# Patient Record
Sex: FEMALE | Race: WHITE
[De-identification: names, ages, dates, MRNs, and addresses within clinical notes are randomized per-mention and may not be internally consistent; named-entity substitution may affect disease eponyms.]

---

## 2017-01-10 ENCOUNTER — RX ONLY (OUTPATIENT)
Age: 67
Setting detail: RX ONLY
End: 2017-01-10

## 2017-01-10 RX ORDER — OXYCODONE HYDROCHLORIDE AND ACETAMINOPHEN 5; 325 MG/1; MG/1
TABLET ORAL
Qty: 40 | Refills: 0 | COMMUNITY
Start: 2017-01-10

## 2017-01-11 ENCOUNTER — RX ONLY (OUTPATIENT)
Age: 67
Setting detail: RX ONLY
End: 2017-01-11

## 2017-01-11 ENCOUNTER — APPOINTMENT (RX ONLY)
Dept: URBAN - METROPOLITAN AREA CLINIC 22 | Facility: CLINIC | Age: 67
Setting detail: DERMATOLOGY
End: 2017-01-11

## 2017-01-11 DIAGNOSIS — Z41.9 ENCOUNTER FOR PROCEDURE FOR PURPOSES OTHER THAN REMEDYING HEALTH STATE, UNSPECIFIED: ICD-10-CM

## 2017-01-11 PROCEDURE — ? PRESCRIPTION

## 2017-01-11 PROCEDURE — 99004: CPT

## 2017-01-11 PROCEDURE — ? PRE-OP WORKLIST

## 2017-01-11 RX ORDER — MEPERIDINE HYDROCHLORIDE 100 MG/1
TABLET ORAL
Qty: 40 | Refills: 0 | COMMUNITY
Start: 2017-01-11

## 2017-01-11 RX ORDER — ONDANSETRON HYDROCHLORIDE 4 MG/1
TABLET, ORALLY DISINTEGRATING ORAL
Qty: 15 | Refills: 0 | Status: ERX | COMMUNITY
Start: 2017-01-11

## 2017-01-11 RX ORDER — CEPHALEXIN 500 MG/1
CAPSULE ORAL
Qty: 28 | Refills: 0 | Status: ERX | COMMUNITY
Start: 2017-01-11

## 2017-01-11 RX ADMIN — ONDANSETRON HYDROCHLORIDE: 4 TABLET, ORALLY DISINTEGRATING ORAL at 16:18

## 2017-01-11 RX ADMIN — CEPHALEXIN: 500 CAPSULE ORAL at 16:18

## 2017-01-11 ASSESSMENT — LOCATION DETAILED DESCRIPTION DERM
LOCATION DETAILED: PERIUMBILICAL SKIN
LOCATION DETAILED: RIGHT MEDIAL BREAST 5-6:00 REGION
LOCATION DETAILED: LEFT MEDIAL BREAST 6-7:00 REGION
LOCATION DETAILED: RIGHT INFERIOR LATERAL LOWER BACK
LOCATION DETAILED: LEFT MEDIAL BREAST 8-9:00 REGION
LOCATION DETAILED: LEFT INFERIOR LATERAL LOWER BACK

## 2017-01-11 ASSESSMENT — LOCATION SIMPLE DESCRIPTION DERM
LOCATION SIMPLE: LEFT BREAST
LOCATION SIMPLE: RIGHT BREAST
LOCATION SIMPLE: LEFT LOWER BACK
LOCATION SIMPLE: RIGHT LOWER BACK
LOCATION SIMPLE: ABDOMEN

## 2017-01-11 ASSESSMENT — LOCATION ZONE DERM: LOCATION ZONE: TRUNK

## 2017-01-11 NOTE — HPI: PREOPERATIVE APPOINTMENT (BRIEF)
Has The Patient Completed Informed Consent?: is scheduled to complete informed consent documentation during this visit
Has The Patient Fulfilled Financial Responsibilities For Surgical Scheduling?: has fulfilled financial responsibilities
Have Arrangements And Instructions Been Made For Patient Transportation?: Transportation arrangements will be made
Date Of Procedure?: 01/25/2017
Facility: [facility]
Surgeon: Dr. Quinn
When Were The Required Fees Received?: 01/10/2017

## 2017-01-11 NOTE — PROCEDURE: PRE-OP WORKLIST
Surgery Scheduled: extended abdominoplasty with liposuction, breast reduction, breast augmentation with saline implants
Admission Status: outpatient
Detail Level: Simple
Photos Taken?: yes
Date Of Surgery: 01/25/2017
Surgeon: Dr. Quinn

## 2017-01-25 ENCOUNTER — APPOINTMENT (RX ONLY)
Dept: URBAN - METROPOLITAN AREA SURGERY CENTER 4 | Facility: SURGERY CENTER | Age: 67
Setting detail: DERMATOLOGY
End: 2017-01-25

## 2017-01-25 DIAGNOSIS — Z41.1 ENCOUNTER FOR COSMETIC SURGERY: ICD-10-CM

## 2017-01-25 PROCEDURE — 19325 BREAST AUGMENTATION W/IMPLT: CPT | Mod: 50

## 2017-01-25 PROCEDURE — 15847 EXC SKIN ABD ADD-ON: CPT

## 2017-01-25 PROCEDURE — 19316 MASTOPEXY: CPT | Mod: 50

## 2017-01-25 PROCEDURE — ? SET GLOBAL PERIOD

## 2017-01-25 ASSESSMENT — LOCATION SIMPLE DESCRIPTION DERM
LOCATION SIMPLE: ABDOMEN
LOCATION SIMPLE: RIGHT BREAST
LOCATION SIMPLE: LEFT BREAST

## 2017-01-25 ASSESSMENT — LOCATION DETAILED DESCRIPTION DERM
LOCATION DETAILED: RIGHT MEDIAL BREAST 5-6:00 REGION
LOCATION DETAILED: PERIUMBILICAL SKIN
LOCATION DETAILED: LEFT MEDIAL BREAST 7-8:00 REGION

## 2017-01-25 ASSESSMENT — LOCATION ZONE DERM: LOCATION ZONE: TRUNK

## 2017-01-25 NOTE — PROCEDURE: SET GLOBAL PERIOD
Other Surgery Performed: abdominoplasty with liposuction bilateral reduction with augmentation
Date Of Surgery (Mm/Dd/Yyyy): 01/25/2017
Surgery Global Period: 0
Detail Level: Detailed

## 2017-01-26 ENCOUNTER — APPOINTMENT (RX ONLY)
Dept: URBAN - METROPOLITAN AREA CLINIC 22 | Facility: CLINIC | Age: 67
Setting detail: DERMATOLOGY
End: 2017-01-26

## 2017-01-26 DIAGNOSIS — Z48.89 ENCOUNTER FOR OTHER SPECIFIED SURGICAL AFTERCARE: ICD-10-CM

## 2017-01-26 PROCEDURE — ? COUNSELING - POST-OP CHECK, BREAST REDUCTION

## 2017-01-26 PROCEDURE — ? COUNSELING - POST-OP CHECK, ABDOMINOPLASTY

## 2017-01-26 PROCEDURE — 99024 POSTOP FOLLOW-UP VISIT: CPT

## 2017-01-26 PROCEDURE — ? COUNSELING - POST-OP CHECK, BREAST AUGMENTATION

## 2017-01-26 PROCEDURE — ? COUNSELING - POST-OP CHECK, LIPOSUCTION

## 2017-01-26 ASSESSMENT — LOCATION DETAILED DESCRIPTION DERM
LOCATION DETAILED: RIGHT INFERIOR LATERAL LOWER BACK
LOCATION DETAILED: LEFT INFERIOR LATERAL LOWER BACK
LOCATION DETAILED: LEFT MEDIAL BREAST 8-9:00 REGION
LOCATION DETAILED: PERIUMBILICAL SKIN
LOCATION DETAILED: RIGHT INFERIOR LATERAL LOWER BACK
LOCATION DETAILED: RIGHT BUTTOCK
LOCATION DETAILED: LEFT INFERIOR LATERAL LOWER BACK
LOCATION DETAILED: PERIUMBILICAL SKIN
LOCATION DETAILED: RIGHT LATERAL BREAST 6-7:00 REGION
LOCATION DETAILED: RIGHT MEDIAL BREAST 5-6:00 REGION

## 2017-01-26 ASSESSMENT — LOCATION SIMPLE DESCRIPTION DERM
LOCATION SIMPLE: RIGHT LOWER BACK
LOCATION SIMPLE: RIGHT BREAST
LOCATION SIMPLE: LEFT LOWER BACK
LOCATION SIMPLE: RIGHT BUTTOCK
LOCATION SIMPLE: ABDOMEN
LOCATION SIMPLE: LEFT LOWER BACK
LOCATION SIMPLE: RIGHT LOWER BACK
LOCATION SIMPLE: LEFT BREAST
LOCATION SIMPLE: ABDOMEN

## 2017-01-26 ASSESSMENT — LOCATION ZONE DERM
LOCATION ZONE: TRUNK
LOCATION ZONE: TRUNK

## 2017-01-26 NOTE — PROCEDURE: REASSURANCE
Follow-Up Increment: 1
Follow-Up Interval: week
Information Provided: Reassurance was provided to the patient that the clinical findings are within expected normal limits and do not require specific further evaluation or treatment at this time.
Detail Level: Simple
Follow-Up: yes

## 2017-02-02 ENCOUNTER — APPOINTMENT (RX ONLY)
Dept: URBAN - METROPOLITAN AREA CLINIC 22 | Facility: CLINIC | Age: 67
Setting detail: DERMATOLOGY
End: 2017-02-02

## 2017-02-02 ENCOUNTER — RX ONLY (OUTPATIENT)
Age: 67
Setting detail: RX ONLY
End: 2017-02-02

## 2017-02-02 DIAGNOSIS — Z48.89 ENCOUNTER FOR OTHER SPECIFIED SURGICAL AFTERCARE: ICD-10-CM

## 2017-02-02 PROCEDURE — 99024 POSTOP FOLLOW-UP VISIT: CPT

## 2017-02-02 PROCEDURE — ? COUNSELING - POST-OP CHECK, ABDOMINOPLASTY

## 2017-02-02 PROCEDURE — ? COUNSELING - POST-OP CHECK, LIPOSUCTION

## 2017-02-02 PROCEDURE — ? SUTURE REMOVAL

## 2017-02-02 PROCEDURE — ? COUNSELING - POST-OP CHECK, BREAST AUGMENTATION

## 2017-02-02 PROCEDURE — ? COUNSELING - POST-OP CHECK, BREAST REDUCTION

## 2017-02-02 PROCEDURE — ? DRAIN REMOVAL

## 2017-02-02 RX ORDER — OXYCODONE HYDROCHLORIDE AND ACETAMINOPHEN 5; 325 MG/1; MG/1
TABLET ORAL
Qty: 40 | Refills: 0 | Status: CANCELLED

## 2017-02-02 ASSESSMENT — LOCATION SIMPLE DESCRIPTION DERM
LOCATION SIMPLE: GROIN
LOCATION SIMPLE: RIGHT LOWER BACK
LOCATION SIMPLE: RIGHT LOWER BACK
LOCATION SIMPLE: ABDOMEN
LOCATION SIMPLE: LEFT BREAST
LOCATION SIMPLE: LEFT LOWER BACK
LOCATION SIMPLE: ABDOMEN
LOCATION SIMPLE: RIGHT BREAST
LOCATION SIMPLE: LEFT LOWER BACK
LOCATION SIMPLE: GLUTEAL CLEFT
LOCATION SIMPLE: RIGHT BUTTOCK

## 2017-02-02 ASSESSMENT — LOCATION ZONE DERM
LOCATION ZONE: TRUNK
LOCATION ZONE: TRUNK

## 2017-02-02 ASSESSMENT — LOCATION DETAILED DESCRIPTION DERM
LOCATION DETAILED: LEFT MEDIAL BREAST 8-9:00 REGION
LOCATION DETAILED: RIGHT INFERIOR LATERAL LOWER BACK
LOCATION DETAILED: PERIUMBILICAL SKIN
LOCATION DETAILED: LEFT INFERIOR LATERAL LOWER BACK
LOCATION DETAILED: RIGHT MEDIAL BREAST 5-6:00 REGION
LOCATION DETAILED: RIGHT SUPRAPUBIC SKIN
LOCATION DETAILED: LEFT INFERIOR LATERAL LOWER BACK
LOCATION DETAILED: PERIUMBILICAL SKIN
LOCATION DETAILED: RIGHT BUTTOCK
LOCATION DETAILED: GLUTEAL CLEFT
LOCATION DETAILED: RIGHT INFERIOR LATERAL LOWER BACK
LOCATION DETAILED: RIGHT LATERAL BREAST 6-7:00 REGION

## 2017-02-02 NOTE — PROCEDURE: REASSURANCE
Follow-Up: yes
Information Provided: Reassurance was provided to the patient that the clinical findings are within expected normal limits and do not require specific further evaluation or treatment at this time.
Follow-Up Increment: 1
Follow-Up Interval: week
Detail Level: Simple

## 2017-02-02 NOTE — HPI: POST-OP CHECK (EXTENDED)
How Severe Is Your Pain?: moderate
How Is Your Wound Healing?: healing well
What Is Your Overall Satisfaction Level With The Current Outcome?: satisfied
Date Of Procedure: 1/25/2017

## 2017-02-09 ENCOUNTER — APPOINTMENT (RX ONLY)
Dept: URBAN - METROPOLITAN AREA CLINIC 22 | Facility: CLINIC | Age: 67
Setting detail: DERMATOLOGY
End: 2017-02-09

## 2017-02-09 DIAGNOSIS — Z48.89 ENCOUNTER FOR OTHER SPECIFIED SURGICAL AFTERCARE: ICD-10-CM

## 2017-02-09 PROCEDURE — ? COUNSELING - POST-OP CHECK, ABDOMINOPLASTY

## 2017-02-09 PROCEDURE — ? COUNSELING - POST-OP CHECK, BREAST REDUCTION

## 2017-02-09 PROCEDURE — 99024 POSTOP FOLLOW-UP VISIT: CPT

## 2017-02-09 PROCEDURE — ? COUNSELING - POST-OP CHECK, LIPOSUCTION

## 2017-02-09 PROCEDURE — ? STAPLE REMOVAL

## 2017-02-09 PROCEDURE — ? COUNSELING - POST-OP CHECK, BREAST AUGMENTATION

## 2017-02-09 PROCEDURE — ? RECOMMENDATIONS

## 2017-02-09 ASSESSMENT — LOCATION SIMPLE DESCRIPTION DERM
LOCATION SIMPLE: RIGHT LOWER BACK
LOCATION SIMPLE: ABDOMEN
LOCATION SIMPLE: LEFT LOWER BACK
LOCATION SIMPLE: LEFT BREAST
LOCATION SIMPLE: RIGHT BREAST

## 2017-02-09 ASSESSMENT — LOCATION ZONE DERM: LOCATION ZONE: TRUNK

## 2017-02-09 ASSESSMENT — LOCATION DETAILED DESCRIPTION DERM
LOCATION DETAILED: RIGHT MEDIAL BREAST 5-6:00 REGION
LOCATION DETAILED: LEFT MEDIAL BREAST 8-9:00 REGION
LOCATION DETAILED: RIGHT MEDIAL BREAST 4-5:00 REGION
LOCATION DETAILED: PERIUMBILICAL SKIN
LOCATION DETAILED: RIGHT INFERIOR LATERAL LOWER BACK
LOCATION DETAILED: LEFT INFERIOR LATERAL LOWER BACK

## 2017-02-09 NOTE — PROCEDURE: REASSURANCE
Follow-Up Interval: weeks
Follow-Up: yes
Information Provided: Reassurance was provided to the patient that the clinical findings are within expected normal limits and do not require specific further evaluation or treatment at this time.
Detail Level: Simple
Follow-Up Increment: 2

## 2017-02-09 NOTE — PROCEDURE: RECOMMENDATIONS
Detail Level: Zone
Recommendation Preamble: The following recommendations were made during the visit:  physician referred patient to Sree Leonard (Urologist) for her difficulty voiding. Patient provided with Dr. Leonard information at today's visit.

## 2017-02-09 NOTE — HPI: POST-OP CHECK (EXTENDED)
How Severe Is Your Pain?: mild
How Is Your Wound Healing?: healing well
Compared To The Last Evaluation, How Have The Findings Changed?: improved
Date Of Procedure: 01/25/2017
Additional History: Patient here today for staple removal, patient states that she is still having difficulty controlling voiding at times.

## 2017-02-23 ENCOUNTER — APPOINTMENT (RX ONLY)
Dept: URBAN - METROPOLITAN AREA CLINIC 22 | Facility: CLINIC | Age: 67
Setting detail: DERMATOLOGY
End: 2017-02-23

## 2017-02-23 DIAGNOSIS — Z48.89 ENCOUNTER FOR OTHER SPECIFIED SURGICAL AFTERCARE: ICD-10-CM

## 2017-02-23 PROCEDURE — ? COUNSELING - POST-OP CHECK, BREAST AUGMENTATION

## 2017-02-23 PROCEDURE — 99024 POSTOP FOLLOW-UP VISIT: CPT

## 2017-02-23 PROCEDURE — ? COUNSELING - POST-OP CHECK, BREAST REDUCTION

## 2017-02-23 PROCEDURE — ? COUNSELING - POST-OP CHECK, LIPOSUCTION

## 2017-02-23 PROCEDURE — ? PATIENT SPECIFIC COUNSELING

## 2017-02-23 PROCEDURE — ? COUNSELING - POST-OP CHECK, ABDOMINOPLASTY

## 2017-02-23 ASSESSMENT — LOCATION SIMPLE DESCRIPTION DERM
LOCATION SIMPLE: RIGHT BREAST
LOCATION SIMPLE: ABDOMEN
LOCATION SIMPLE: LEFT BREAST
LOCATION SIMPLE: LEFT LOWER BACK
LOCATION SIMPLE: RIGHT LOWER BACK

## 2017-02-23 ASSESSMENT — LOCATION ZONE DERM: LOCATION ZONE: TRUNK

## 2017-02-23 ASSESSMENT — LOCATION DETAILED DESCRIPTION DERM
LOCATION DETAILED: RIGHT MEDIAL BREAST 5-6:00 REGION
LOCATION DETAILED: RIGHT INFERIOR LATERAL LOWER BACK
LOCATION DETAILED: LEFT INFERIOR LATERAL LOWER BACK
LOCATION DETAILED: LEFT MEDIAL BREAST 8-9:00 REGION
LOCATION DETAILED: PERIUMBILICAL SKIN

## 2017-02-23 NOTE — PROCEDURE: PATIENT SPECIFIC COUNSELING
Other (Free Text): Patient was counseled to scrub the area well daily removing an scabs. She was also counseled to not pick at the area, only scrub with a wash cloth. Apply vaseline to the area after washing
Detail Level: Simple

## 2017-02-23 NOTE — HPI: POST-OP CHECK (EXTENDED)
How Severe Is Your Pain?: mild
How Is Your Wound Healing?: healing well
What Is Your Overall Satisfaction Level With The Current Outcome?: satisfied
Date Of Procedure: 01/25/2017

## 2017-03-09 ENCOUNTER — APPOINTMENT (RX ONLY)
Dept: URBAN - METROPOLITAN AREA CLINIC 22 | Facility: CLINIC | Age: 67
Setting detail: DERMATOLOGY
End: 2017-03-09

## 2017-03-09 DIAGNOSIS — Z48.89 ENCOUNTER FOR OTHER SPECIFIED SURGICAL AFTERCARE: ICD-10-CM

## 2017-03-09 PROCEDURE — ? COUNSELING - POST-OP CHECK, BREAST AUGMENTATION

## 2017-03-09 PROCEDURE — ? COUNSELING - POST-OP CHECK, BREAST REDUCTION

## 2017-03-09 PROCEDURE — ? COUNSELING - POST-OP CHECK, ABDOMINOPLASTY

## 2017-03-09 PROCEDURE — ? COUNSELING - POST-OP CHECK, LIPOSUCTION

## 2017-03-09 PROCEDURE — 99024 POSTOP FOLLOW-UP VISIT: CPT

## 2017-03-09 ASSESSMENT — LOCATION DETAILED DESCRIPTION DERM
LOCATION DETAILED: RIGHT MEDIAL BREAST 5-6:00 REGION
LOCATION DETAILED: RIGHT INFERIOR LATERAL LOWER BACK
LOCATION DETAILED: LEFT MEDIAL BREAST 8-9:00 REGION
LOCATION DETAILED: LEFT INFERIOR LATERAL LOWER BACK
LOCATION DETAILED: PERIUMBILICAL SKIN

## 2017-03-09 ASSESSMENT — LOCATION SIMPLE DESCRIPTION DERM
LOCATION SIMPLE: ABDOMEN
LOCATION SIMPLE: LEFT LOWER BACK
LOCATION SIMPLE: LEFT BREAST
LOCATION SIMPLE: RIGHT LOWER BACK
LOCATION SIMPLE: RIGHT BREAST

## 2017-03-09 ASSESSMENT — LOCATION ZONE DERM: LOCATION ZONE: TRUNK

## 2017-03-09 NOTE — HPI: POST-OP CHECK (EXTENDED)
How Severe Is Your Pain?: mild
How Is Your Wound Healing?: healing well
Date Of Procedure: 01/25/2017

## 2017-06-12 ENCOUNTER — APPOINTMENT (RX ONLY)
Dept: URBAN - METROPOLITAN AREA CLINIC 22 | Facility: CLINIC | Age: 67
Setting detail: DERMATOLOGY
End: 2017-06-12

## 2017-06-12 DIAGNOSIS — Z48.89 ENCOUNTER FOR OTHER SPECIFIED SURGICAL AFTERCARE: ICD-10-CM

## 2017-06-12 PROCEDURE — ? COUNSELING - POST-OP CHECK, LIPOSUCTION

## 2017-06-12 PROCEDURE — ? COUNSELING - POST-OP CHECK, BREAST AUGMENTATION

## 2017-06-12 PROCEDURE — ? ORDER FOR SURGERY

## 2017-06-12 PROCEDURE — ? COUNSELING - POST-OP CHECK, ABDOMINOPLASTY

## 2017-06-12 PROCEDURE — ? COUNSELING - POST-OP CHECK, BREAST REDUCTION

## 2017-06-12 PROCEDURE — 99024 POSTOP FOLLOW-UP VISIT: CPT

## 2017-06-12 ASSESSMENT — LOCATION SIMPLE DESCRIPTION DERM
LOCATION SIMPLE: RIGHT BREAST
LOCATION SIMPLE: ABDOMEN
LOCATION SIMPLE: RIGHT LOWER BACK
LOCATION SIMPLE: LEFT LOWER BACK
LOCATION SIMPLE: LEFT BREAST
LOCATION SIMPLE: ABDOMEN

## 2017-06-12 ASSESSMENT — LOCATION DETAILED DESCRIPTION DERM
LOCATION DETAILED: RIGHT INFERIOR LATERAL MIDBACK
LOCATION DETAILED: RIGHT MEDIAL BREAST 5-6:00 REGION
LOCATION DETAILED: LEFT MEDIAL BREAST 8-9:00 REGION
LOCATION DETAILED: PERIUMBILICAL SKIN
LOCATION DETAILED: PERIUMBILICAL SKIN
LOCATION DETAILED: LEFT INFERIOR LATERAL MIDBACK

## 2017-06-12 ASSESSMENT — LOCATION ZONE DERM
LOCATION ZONE: TRUNK
LOCATION ZONE: TRUNK

## 2017-06-12 NOTE — PROCEDURE: REASSURANCE
Follow-Up Interval: months
Follow-Up: yes
Follow-Up Increment: 6
Detail Level: Simple
Information Provided: Reassurance was provided to the patient that the clinical findings are within expected normal limits and do not require specific further evaluation or treatment at this time. Physician counseled the patient that he does not feel that additional surgery will improve his results any better than they currently are and physician discussed that the outcome would not be worth spending the fees
Information Provided: Reassurance was provided to the patient that the clinical findings are within expected normal limits and do not require specific further evaluation or treatment at this time. Physician counseled the patient that he does not feel that additional surgery will improve his results any better than they currently are and physician discussed that the outcome would not be worth spending the fees

## 2017-06-12 NOTE — PROCEDURE: ORDER FOR SURGERY
Surgery To Be Ordered: liposuction revision
Cosmetic, Major Or Minor?: Cosmetic
[facility]
Detail Level: Simple
Date Of Surgery: TBD
Surgeon: Dr. Quinn
Estimated Length Of Surgery: 1 hour
Admission Status: outpatient
Photos Taken?: no

## 2017-06-12 NOTE — HPI: POST-OP CHECK (EXTENDED)
How Severe Is Your Pain?: 0 out of 10
How Is Your Wound Healing?: healing well
Date Of Procedure: 01/25/2017

## 2017-07-20 ENCOUNTER — APPOINTMENT (RX ONLY)
Dept: URBAN - METROPOLITAN AREA CLINIC 22 | Facility: CLINIC | Age: 67
Setting detail: DERMATOLOGY
End: 2017-07-20

## 2017-07-20 ENCOUNTER — RX ONLY (OUTPATIENT)
Age: 67
Setting detail: RX ONLY
End: 2017-07-20

## 2017-07-20 DIAGNOSIS — Z41.9 ENCOUNTER FOR PROCEDURE FOR PURPOSES OTHER THAN REMEDYING HEALTH STATE, UNSPECIFIED: ICD-10-CM

## 2017-07-20 PROCEDURE — 99004: CPT

## 2017-07-20 PROCEDURE — ? PRE-OP WORKLIST

## 2017-07-20 PROCEDURE — ? PRESCRIPTION

## 2017-07-20 RX ORDER — ONDANSETRON HYDROCHLORIDE 4 MG/1
TABLET, ORALLY DISINTEGRATING ORAL
Qty: 15 | Refills: 0 | Status: ERX

## 2017-07-20 RX ORDER — MEPERIDINE HYDROCHLORIDE 50 MG/1
TABLET ORAL
Qty: 40 | Refills: 0 | COMMUNITY
Start: 2017-07-20

## 2017-07-20 RX ORDER — CEPHALEXIN 500 MG/1
CAPSULE ORAL
Qty: 28 | Refills: 0 | Status: ERX

## 2017-07-20 RX ORDER — MEPERIDINE HYDROCHLORIDE 100 MG/1
TABLET ORAL
Qty: 40 | Refills: 0

## 2017-07-20 ASSESSMENT — LOCATION DETAILED DESCRIPTION DERM: LOCATION DETAILED: PERIUMBILICAL SKIN

## 2017-07-20 ASSESSMENT — LOCATION SIMPLE DESCRIPTION DERM: LOCATION SIMPLE: ABDOMEN

## 2017-07-20 ASSESSMENT — LOCATION ZONE DERM: LOCATION ZONE: TRUNK

## 2017-07-20 NOTE — HPI: PREOPERATIVE APPOINTMENT (BRIEF)
Have Arrangements And Instructions Been Made For Patient Transportation?: Transportation arrangements have been made
Date Of Procedure?: 07/27/2017
Facility: [facility]
Surgeon: Dr. Quinn

## 2017-07-20 NOTE — PROCEDURE: PRE-OP WORKLIST
Admission Status: outpatient
Detail Level: Simple
Date Of Surgery: 07/27/2017
Surgery Scheduled: liposuction revision abdomen
Photos Taken?: yes
Surgeon: Dr. Quinn

## 2017-07-27 ENCOUNTER — APPOINTMENT (RX ONLY)
Dept: URBAN - METROPOLITAN AREA SURGERY CENTER 4 | Facility: SURGERY CENTER | Age: 67
Setting detail: DERMATOLOGY
End: 2017-07-27

## 2017-07-27 DIAGNOSIS — Z41.1 ENCOUNTER FOR COSMETIC SURGERY: ICD-10-CM

## 2017-07-27 PROCEDURE — 99006: CPT

## 2017-07-27 PROCEDURE — ? SET GLOBAL PERIOD

## 2017-07-27 ASSESSMENT — LOCATION ZONE DERM: LOCATION ZONE: TRUNK

## 2017-07-27 ASSESSMENT — LOCATION DETAILED DESCRIPTION DERM: LOCATION DETAILED: PERIUMBILICAL SKIN

## 2017-07-27 ASSESSMENT — LOCATION SIMPLE DESCRIPTION DERM: LOCATION SIMPLE: ABDOMEN

## 2017-08-03 ENCOUNTER — APPOINTMENT (RX ONLY)
Dept: URBAN - METROPOLITAN AREA CLINIC 22 | Facility: CLINIC | Age: 67
Setting detail: DERMATOLOGY
End: 2017-08-03

## 2017-08-03 DIAGNOSIS — Z48.89 ENCOUNTER FOR OTHER SPECIFIED SURGICAL AFTERCARE: ICD-10-CM

## 2017-08-03 DIAGNOSIS — L91.8 OTHER HYPERTROPHIC DISORDERS OF THE SKIN: ICD-10-CM

## 2017-08-03 PROCEDURE — 99024 POSTOP FOLLOW-UP VISIT: CPT

## 2017-08-03 PROCEDURE — ? SUTURE REMOVAL

## 2017-08-03 PROCEDURE — ? PATIENT SPECIFIC COUNSELING

## 2017-08-03 PROCEDURE — ? COUNSELING - POST-OP CHECK, LIPOSUCTION

## 2017-08-03 PROCEDURE — ? COUNSELING - ACROCHORDONS

## 2017-08-03 ASSESSMENT — LOCATION ZONE DERM: LOCATION ZONE: TRUNK

## 2017-08-03 ASSESSMENT — LOCATION SIMPLE DESCRIPTION DERM
LOCATION SIMPLE: CHEST
LOCATION SIMPLE: GROIN

## 2017-08-03 ASSESSMENT — LOCATION DETAILED DESCRIPTION DERM
LOCATION DETAILED: LOWER STERNUM
LOCATION DETAILED: RIGHT SUPRAPUBIC SKIN

## 2017-08-03 NOTE — PROCEDURE: PATIENT SPECIFIC COUNSELING
Detail Level: Detailed
Other (Free Text): Patient was counseled that the skin tag could be removed at her next follow up visit if desired

## 2017-08-03 NOTE — HPI: POST-OP CHECK (EXTENDED)
How Severe Is Your Pain?: mild
How Is Your Wound Healing?: healing well
What Is Your Overall Satisfaction Level With The Current Outcome?: satisfied
Date Of Procedure: 07/27/2017

## 2017-08-31 ENCOUNTER — APPOINTMENT (RX ONLY)
Dept: URBAN - METROPOLITAN AREA CLINIC 22 | Facility: CLINIC | Age: 67
Setting detail: DERMATOLOGY
End: 2017-08-31

## 2017-08-31 DIAGNOSIS — Z48.89 ENCOUNTER FOR OTHER SPECIFIED SURGICAL AFTERCARE: ICD-10-CM

## 2017-08-31 PROCEDURE — ? COUNSELING - POST-OP CHECK, LIPOSUCTION

## 2017-08-31 PROCEDURE — 99024 POSTOP FOLLOW-UP VISIT: CPT

## 2017-08-31 ASSESSMENT — LOCATION DETAILED DESCRIPTION DERM
LOCATION DETAILED: PERIUMBILICAL SKIN
LOCATION DETAILED: LEFT SUPERIOR LATERAL LOWER BACK
LOCATION DETAILED: RIGHT SUPERIOR LATERAL LOWER BACK

## 2017-08-31 ASSESSMENT — LOCATION SIMPLE DESCRIPTION DERM
LOCATION SIMPLE: ABDOMEN
LOCATION SIMPLE: RIGHT LOWER BACK
LOCATION SIMPLE: LEFT LOWER BACK

## 2017-08-31 ASSESSMENT — LOCATION ZONE DERM: LOCATION ZONE: TRUNK

## 2017-08-31 NOTE — HPI: POST-OP CHECK (EXTENDED)
How Is Your Wound Healing?: healed
What Is Your Overall Satisfaction Level With The Current Outcome?: satisfied
Date Of Procedure: 07/27/2017

## 2017-11-29 ENCOUNTER — APPOINTMENT (RX ONLY)
Dept: URBAN - METROPOLITAN AREA CLINIC 22 | Facility: CLINIC | Age: 67
Setting detail: DERMATOLOGY
End: 2017-11-29

## 2017-11-29 DIAGNOSIS — Z48.89 ENCOUNTER FOR OTHER SPECIFIED SURGICAL AFTERCARE: ICD-10-CM

## 2017-11-29 PROCEDURE — ? COUNSELING - POST-OP CHECK, LIPOSUCTION

## 2017-11-29 PROCEDURE — 99024 POSTOP FOLLOW-UP VISIT: CPT

## 2017-11-29 ASSESSMENT — LOCATION SIMPLE DESCRIPTION DERM: LOCATION SIMPLE: ABDOMEN

## 2017-11-29 ASSESSMENT — LOCATION DETAILED DESCRIPTION DERM: LOCATION DETAILED: PERIUMBILICAL SKIN

## 2017-11-29 ASSESSMENT — LOCATION ZONE DERM: LOCATION ZONE: TRUNK

## 2017-11-29 NOTE — PROCEDURE: COUNSELING - POST-OP CHECK, LIPOSUCTION
Detail Level: Simple
Other Plan: Physician discussed potentially preforming an umbilical revision if the patient desires to in order to improve abdominal bulge on left upper abdomen. Physician counseled the patient that he doesn't recommend it at this time but states that he would be willing to preform the procedure if the patient is interested. Physician counseled the patient that he does not believe it will 100% improve the bulged area. Physician counseled the patient that he could preform the surgery at the surgery center with no surgeons fee. Patient was instructed to follow up in May, or sooner if patient desires to move forward with the procedure. The surgery will take 1 hour to be preformed
Add Postop Global No-Charge Code (41618)?: yes

## 2017-11-29 NOTE — HPI: POST-OP CHECK, COSMETIC (EXTENDED)
How Severe Is Your Pain?: 0 out of 10
How Is Your Wound Healing?: healing well
Date Of Procedure: 07/27/2017
Additional History: Patient states the bulge on her left upper abdomen remains present without changes from her last visit.
